# Patient Record
Sex: MALE | Employment: FULL TIME | URBAN - METROPOLITAN AREA
[De-identification: names, ages, dates, MRNs, and addresses within clinical notes are randomized per-mention and may not be internally consistent; named-entity substitution may affect disease eponyms.]

---
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
## 2023-01-16 ENCOUNTER — HOSPITAL ENCOUNTER (OUTPATIENT)
Dept: OCCUPATIONAL THERAPY | Age: 18
Setting detail: THERAPIES SERIES
End: 2023-01-16
Payer: COMMERCIAL

## 2023-03-20 ENCOUNTER — HOSPITAL ENCOUNTER (OUTPATIENT)
Dept: OCCUPATIONAL THERAPY | Age: 18
Setting detail: THERAPIES SERIES
Discharge: HOME | End: 2023-03-20
Payer: COMMERCIAL

## 2023-03-20 PROCEDURE — 97537 COMMUNITY/WORK REINTEGRATION: CPT

## 2023-03-20 NOTE — PROGRESS NOTES
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  Since then he has been participating in  training sessions using a left foot accelerator, and spinner knob. As you know, Mr. Tova Ny suffers from hemiparesis and cerebral palsy. He wants to drive to be independent in community mobility, work, and leisure skills. Mr. Tova Ny passed tests for visual acuity, saccades, pursuits, depth perception, peripheral vision, color vision, and traffic signs and signals. He was administered the Trails Making Tests Part A and B which are cognitive tests that involve scanning, speed of mental processing, visual motor sequencing, as well as switching cognitive sets. On Part A, he scored within normal limits with 18 seconds over a norm of 60 seconds and on Part B, he scored within normal limits with 24.5 seconds over the norm of 180 seconds. He demonstrated functional physical capacity for driving with a left foot accelerator, and spinner knob. Behind the wheel, Mr. Tova Ny was able to manipulate all vehicle controls. He drove on secondary and primary roads in light to moderately heavy traffic. He demonstrated the ability to park, back up, maintain speed and traffic flow, change lanes and follow directions. He will continue training with his mother prior to taking the  examination to obtain his 's license. He will be required to have a BMV 2310 form filled out prior to taking his  examination. I can be reached at [phone] if questions arise.   Thank you for this referral.    Sincerely,      Yoselin Vicente, [address] [facility], [address] [facility]   Certified  Rehabilitation Specialist

## 2023-03-20 NOTE — PROGRESS NOTES
Occupational Therapy  Occupational Therapy  Rehabilitation Daily Treatment Note    Diya Garcia  2005   [id number]      3/20/2023  12:11 PM  No current outpatient medications on file. No current facility-administered medications for this encounter. Diagnosis: CVA, Cerebral Palsy  Treatment diagnosis:  [place]   Insurance/Certification Information: BCBS, Medicaid  Physician Information: Dr. Yovanny Shah: Pt reports he has been driving between his home and [facility] with his mom. Objective Observations related to Long Term Goals:    1. Pt will complete driving on primary and secondary roads with no intervention required for steering or braking. He drove in moderate traffic and did not require any intervention for braking or steering. He was able to maintain his noah positioning. 2.  Pt will complete regular parking with no verbal cues or intervention required. He completed parking in a [employer] parking lot and did not require any intervention for pulling into or backing out of the parking space. 3.  Pt will complete manueverability course/parallel parking with minimal verbal cues. He completed maneuverability to both sides. He only required cues for stopping with the vehicle parallel with the course. He was instructed to get the vehicle straight with the course before turning his head to look for the point cone. He then was able to complete without cues. He completed parallel parking with minimal cues for technique. 4.  Pt will drive on the expressway with no intervention required for steering or braking. He drove on the expressway without any intervention. 5.  Pt will complete safe noah changes with no verbal cues or intervention required. He completed with traffic present without any cues including on the expressway. 6.  Pt will complete stop sign procedures and appropriate turn taking with no verbal cues or intervention required.  Completed with no